# Patient Record
Sex: FEMALE | Race: WHITE | NOT HISPANIC OR LATINO | URBAN - METROPOLITAN AREA
[De-identification: names, ages, dates, MRNs, and addresses within clinical notes are randomized per-mention and may not be internally consistent; named-entity substitution may affect disease eponyms.]

---
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
## 2022-01-04 ENCOUNTER — APPOINTMENT (OUTPATIENT)
Dept: UROLOGY | Facility: CLINIC | Age: 22
End: 2022-01-04

## 2022-01-18 ENCOUNTER — APPOINTMENT (OUTPATIENT)
Dept: UROLOGY | Facility: CLINIC | Age: 22
End: 2022-01-18

## 2022-01-25 ENCOUNTER — APPOINTMENT (OUTPATIENT)
Dept: UROLOGY | Facility: CLINIC | Age: 22
End: 2022-01-25

## 2022-03-01 ENCOUNTER — APPOINTMENT (OUTPATIENT)
Dept: UROLOGY | Facility: CLINIC | Age: 22
End: 2022-03-01
Payer: MEDICAID

## 2022-03-01 DIAGNOSIS — R30.0 DYSURIA: ICD-10-CM

## 2022-03-01 DIAGNOSIS — R33.8 OTHER RETENTION OF URINE: ICD-10-CM

## 2022-03-01 DIAGNOSIS — N39.0 URINARY TRACT INFECTION, SITE NOT SPECIFIED: ICD-10-CM

## 2022-03-01 PROCEDURE — 99214 OFFICE O/P EST MOD 30 MIN: CPT

## 2022-03-01 PROCEDURE — 51798 US URINE CAPACITY MEASURE: CPT

## 2022-03-01 RX ORDER — MULTIVITAMIN
TABLET ORAL DAILY
Refills: 0 | Status: ACTIVE | COMMUNITY
Start: 2022-03-01

## 2022-03-01 RX ORDER — NADOLOL 20 MG/1
20 TABLET ORAL
Refills: 0 | Status: DISCONTINUED | COMMUNITY
End: 2022-03-01

## 2022-03-01 RX ORDER — FOLIC ACID 1 MG/1
1 TABLET ORAL
Qty: 30 | Refills: 0 | Status: DISCONTINUED | COMMUNITY
Start: 2017-10-05 | End: 2022-03-01

## 2022-03-01 RX ORDER — NADOLOL 20 MG/1
20 TABLET ORAL TWICE DAILY
Refills: 0 | Status: ACTIVE | COMMUNITY
Start: 2022-03-01

## 2022-03-01 RX ORDER — MELATONIN 3 MG
3 CAPSULE ORAL
Refills: 0 | Status: DISCONTINUED | COMMUNITY
End: 2022-03-01

## 2022-03-01 RX ORDER — DIVALPROEX SODIUM 500 MG/1
500 TABLET, DELAYED RELEASE ORAL
Refills: 0 | Status: DISCONTINUED | COMMUNITY
End: 2022-03-01

## 2022-03-24 ENCOUNTER — APPOINTMENT (OUTPATIENT)
Dept: PEDIATRICS | Facility: CLINIC | Age: 22
End: 2022-03-24

## 2022-04-19 ENCOUNTER — APPOINTMENT (OUTPATIENT)
Dept: UROLOGY | Facility: CLINIC | Age: 22
End: 2022-04-19
Payer: MEDICAID

## 2022-04-19 VITALS
HEART RATE: 69 BPM | SYSTOLIC BLOOD PRESSURE: 116 MMHG | WEIGHT: 126 LBS | TEMPERATURE: 97.7 F | BODY MASS INDEX: 20.49 KG/M2 | OXYGEN SATURATION: 95 % | DIASTOLIC BLOOD PRESSURE: 78 MMHG | HEIGHT: 65.75 IN | RESPIRATION RATE: 14 BRPM

## 2022-04-19 DIAGNOSIS — R35.0 FREQUENCY OF MICTURITION: ICD-10-CM

## 2022-04-19 PROCEDURE — 99213 OFFICE O/P EST LOW 20 MIN: CPT

## 2022-04-19 RX ORDER — OXYBUTYNIN CHLORIDE 10 MG/1
10 TABLET, EXTENDED RELEASE ORAL DAILY
Qty: 30 | Refills: 5 | Status: DISCONTINUED | COMMUNITY
Start: 2021-10-15 | End: 2022-04-19

## 2022-05-04 ENCOUNTER — APPOINTMENT (OUTPATIENT)
Dept: PEDIATRICS | Facility: CLINIC | Age: 22
End: 2022-05-04

## 2022-05-06 ENCOUNTER — APPOINTMENT (OUTPATIENT)
Dept: PEDIATRICS | Facility: CLINIC | Age: 22
End: 2022-05-06
Payer: MEDICAID

## 2022-05-06 VITALS — TEMPERATURE: 97.9 F

## 2022-05-06 DIAGNOSIS — R10.9 UNSPECIFIED ABDOMINAL PAIN: ICD-10-CM

## 2022-05-06 PROCEDURE — 99213 OFFICE O/P EST LOW 20 MIN: CPT

## 2022-05-06 NOTE — HISTORY OF PRESENT ILLNESS
[de-identified] : gastro pain wants referral  [FreeTextEntry6] : here for epigastric pain x4 days. was seen in ER and diagnosed with GERD, given pepcid, mylanta and lidocaine with some relief. pain is worsening right after eating, sharp, stabbing pain in nature and lasts hours. today having 3 episodes of diarrhea. no blood in stool. no N/V. no fevers. EKG was normal in ER. eating bland foods. diverticulits and GERD in family history.

## 2022-05-06 NOTE — DISCUSSION/SUMMARY
[FreeTextEntry1] : Start pepcid as ordered. Continue bland, binding diet in small portions. FOr diarrhea, increase fluids. Requested to see GI at this time. will follow up.

## 2022-05-06 NOTE — REVIEW OF SYSTEMS
[Diarrhea] : diarrhea [Abdominal Pain] : abdominal pain [Negative] : Genitourinary [Fever] : no fever [Vomiting] : no vomiting [Constipation] : no constipation

## 2022-05-06 NOTE — PHYSICAL EXAM
[Tenderness with Palpation] : tenderness with palpation [Psoas Sign Negative] : psoas sign negative [Obturator Sign Negative] : obturator sign negative [NL] : warm [FreeTextEntry9] : tenderness to epigastric area.

## 2022-05-07 DIAGNOSIS — K21.9 GASTRO-ESOPHAGEAL REFLUX DISEASE W/OUT ESOPHAGITIS: ICD-10-CM

## 2022-06-23 NOTE — HISTORY OF PRESENT ILLNESS
Recent issues:  Followup diabetes and thyroid evaluation, with daughter Jessie  Recent health issues with right abdomen pain, some skin itching, then hospitalized at [facility] 5/2022,    She reports being diagnosed with liver problem and suspicion for PBC, GI eval with Dr. Zuniga    Had head MRI, plan for future abdomen MRI and liver biopsy  Using the metformin, Ozempic, VGo pod device with Novolog and the Freestyle Chandni sensor  Taking the [place] thyroid and levothyroxine meds regularly  No longer employed, loses insurance 7/1/22?           Diabetes:  ~4/2010. Initial diagnosis approx age 47  ...Has taken several DM meds including metformin, Byetta, glimeparide, Invokana, and Victoza   In 2018, she had been taking metformin, Victoza, glimeparide, and Jardiance  She stopped Jardiance, also ran out of Victoza last year  10/2018. Abdominal abcess illness, hospitalizations at [facility] and then [facility] hospital              2-surgeries for abcess, thought related to the hysterectomy bladder sling mesh from prior surgery     Previously taking metformin 750 mg BID, glimeparide 4 mg every day, Tresiba 40U every day   10/2020. Changed to VGo40 pods management  Continue low dose glimeparide, but low SG levels and glimeparide discontinued, then restarted   Current DM meds:  Novolog in VGo40      Small carb meal 1-2 clicks      Large carb meal 2 clicks    Novolog    sscale:  -250  1-click      -300  2 clicks  Metformin 750 mg 2-tabs in morning  Ozempic 0.5 mg  Subcutaneous weekly    Has Glucocard Vital BG meter              Infrequent BG testing  10/2020. Started Freestyle Chandni CGM with [facility]  Recent Chandni data: none available    Recent [facility] labs include:  7/26/19 hgbA1c 9.3%, t.chol 195, , TSH 0.06, FT4 1.46, FT3 8.7, Cr 0.77  8/26/20 hgbA1c 11.4%  10/15/21 hgbA1c 6.6%, LDL 60 mg/dl     Recent  labs include:  Lab Results   Component Value Date    A1C 7.2 (H) 01/05/2021     05/26/2022     POTASSIUM 3.8 2022    CHLORIDE 105 2022    CO2 24 2022    ANIONGAP 6 2022     (H) 2022    BUN 15 2022    CR 0.57 2022    GFRESTIMATED >90 2022    GFRESTBLACK >90 2021    JOANN 8.7 2022    CHOL 151 2021    TRIG 194 (A) 10/15/2021    HDL 41 (L) 2021    LDL 76 2021    NHDL 110 2021    UCRR 86 2021    MICROL 19 2021    UMALCR 22.31 2021     Last eye exam ?, results not available  DM Complications:  None known        Thyroid:  Had taken Synthroid  Changed to [place] thyroid medication, then [place] + levothyroxine combo dose plan  Had taken [place] thyroid 2-tablets daily... Possibly 60 mg 2-tabs QD  Early-mid 3/2018. Ran out of [place] thyroid medication  Previously took [place] thyroid 90 mg 2-tabs daily and low dose levothyroxine 0.025 mg daily  2019. Changed to lower dose [place] and higher dose levothyroxine  Subsequent dose reductions with levothyroxine medication  Mid 2021 to early 2022. 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 Frantz Guillen?/[facility]       PMH/PSH:  Past Medical History:   Diagnosis Date     Hypothyroid 80's     Necrotizing fasciitis (H)      Primary osteoarthritis of both knees      Soft tissue infection      Type 2 diabetes mellitus (H)      Past Surgical History:   Procedure Laterality Date      SECTION       COLONOSCOPY N/A 10/21/2015     Procedure: COLONOSCOPY;  Surgeon: Jaky Arredondo MD;  Location:  GI     IRRIGATION AND DEBRIDEMENT ABDOMEN WASHOUT, COMBINED N/A 10/12/2018    Procedure: COMBINED IRRIGATION AND DEBRIDEMENT ABDOMEN WASHOUT;  Irrigation and Debridement of Lower Abdomen, removal of piece of mesh.;  Surgeon: Darlene Hogue MD;  Location: [facility] OR     marisol/bso  2004    due to anemia     ZZC REPAIR CRUCIATE LIGAMENT,KNEE  1986       Family Hx:  No family history on file.      Social Hx:  Social History     Socioeconomic History     Marital status:      Spouse name: Not on file     Number of children: 2     Years of education: Not on file     Highest education level: Not on file   Occupational History     Occupation: surgery scheduler [employer]   Tobacco Use     Smoking status: Former Smoker     Quit date: 2011     Years since quittin.3     Smokeless tobacco: Never Used   Substance and Sexual Activity     Alcohol use: No     Drug use: No     Sexual activity: Not on file   Other Topics Concern     Not on file   Social History Narrative     Not on file     Social Determinants of Health     Financial Resource Strain: Not on file   Food Insecurity: Not on file   Transportation Needs: Not on file   Physical Activity: Not on file   Stress: Not on file   Social Connections: Not on file   Intimate Partner Violence: Not on file   Housing Stability: Not on file          MEDICATIONS:  has a current medication list which includes the following prescription(s): celecoxib, insulin lispro, levothyroxine, metformin, omeprazole, semaglutide, thyroid, valacyclovir, wearable insulin delivery device, acetaminophen, blood glucose, freestyle evelyn reader, freestyle evelyn 14 day sensor, b-d u/f, and [DISCONTINUED] rosuvastatin.     ROS: 10 point ROS neg other than the symptoms noted above in the HPI.     GENERAL: some fatigue, wt loss?; denies fevers, chills, night sweats.   HEENT:  no dysphagia, odonophagia, diplopia,  neck pain  THYROID:  no apparent hyper or hypothyroid symptoms  CV:  some palpitations; denies chest pain, pressure  LUNGS:  denies SOB, dyspnea, cough, wheezing  ABDOMEN:  Intermittent right abdomen pains; denies diarrhea, indigestion, constipation  EXTREMITIES: no rashes, ulcers, edema  NEUROLOGY: forgetfulness; no headaches, denies changes in vision, tingling, extremitiy numbness   MSK: knee and some low back pain; no muscle aches or pains, weakness  SKIN: no rashes or lesions  : no increased thirst and urination, nocturia; no menses since hysterectomy ~age 52  PSYCH:  stable mood, no significant anxiety or depression  ENDOCRINE: no heat or cold intolerance      Physical Exam   VS: /81   Pulse 73   Wt 106.2 kg (234 lb 3.2 oz)   BMI 40.20 kg/m    GENERAL: AXOX3, NAD, well dressed, answering questions appropriately, appears stated age.  ENT: no nose swelling or nasal discharge, mouth redness or gum changes.  EYES: eyes grossly normal to inspection, conjunctivae and sclerae normal, no exophthalmos or proptosis  THYROID:  no apparent nodules or goiter  LUNGS: no audible wheeze, cough or visible cyanosis, or increased work of breathing  ABDOMEN: abdomen size  EXTREMITIES: no edema noted  NEUROLOGY: CN grossly intact, no tremors  MSK: grossly intact  SKIN:  no apparent skin lesions, rash, or edema with visualized skin appearance  PSYCH: mentation appears normal, affect normal/bright, judgement and insight intact,   normal speech and appearance well groomed     LABS:     All pertinent notes, labs, and images personally reviewed by me.      A/P:  Encounter Diagnoses   Name Primary?     Type 2 diabetes mellitus without complication, without long-term current use of insulin (H) Yes     Acquired hypothyroidism      Morbid obesity (H)        Comments:  Reviewed health issues, diabetes and thyroid management.  Difficult to assess glycemic control without BGM or SG data  Reviewed recent available lab results from  [facility] clinic  Reviewed and interpreted tests that I previously ordered.   Ordered appropriate tests for the endocrinology disease management.    Management options discussed and implemented after shared medical decision making with the patient.  T2DM and hypothyroidism problems are chronic-stable    Plan:  Discussed general issues with the diabetes diagnosis and management  We discussed the hgbA1c test which reflects previous overall glucose levels or control  Discussed the importance of blood glucose (BG) testing to assess glucose trends  Provided general overview of the diabetes medication options and medication treatment plan  Reviewed recent Chandni CGM glucose trend data, in detail.    Recommend:  Continue the current VGo40, metformin, and Ozempic med plan at this time  We have reviewed dosing options using the VGo clicks for small and large carb meals  Consider change to the OmniPod pump alternative, if affordable  Goal target -150 mg/dl  Contact me if cost issues with Novolog, VGo, Ozempic, or other endo meds  Continue use of the 14-day Freestyle Chandni CGM sensor    Scan sensor 3-4x/day... more often    Use SG value for final decision on mealtime VGo clicks  Lab testing:    No lab tests ordered at this time    Needs repeat thyroid and diabetes lab testing in 8/2022 or 9/2022  Keep focus on diet, exercise, weight management.  Change to NP-Thyroid 60 mg daily and levothyroxine 88 daily dose plan    Sent updated Rx's to [facility]  Needs f/u on the mild hypercholesterolemia, consider adding statin medication for treatment  Keep focus on diet, exercise, weight management.  Advise having fasting lipid panel testing and dilated eye examination, at least annually    Addressed patient questions today     There are no Patient Instructions on file for this visit.    Future labs ordered today: No orders of the defined types were placed in this encounter.    Radiology/Consults ordered today: None    Total  time spent in with the patient evaluation:  25 min  Additional time spent reviewing pertinent lab tests and chart notes, and documentation:  10 min    Follow-up:  10/2022    JOJO Christianson MD, MS  Endocrinology  [facility]                                         [FreeTextEntry1] : Misty is a 17 year old female teenager with anxiety, possible ADHD and epilepsy who presents for a cardiac evaluation in regard to a heart murmur. Misty initially presented at 9 years of age with a heart murmur for evaluation by Dr. Cunningham at [facility]. He subsequently evaluated her for several years due to her innocent heart murmur (last visit which included an ECG and echocardiogram in 2014). Misty has anxiety and was recently diagnosed with possible ADHD and was started on Strattera five weeks ago (prescribed by her neurologist Dr. Alfonso Mulligan). As reported by her mother, both Dr. Mulligan and Dr. Mcgill (PCP) felt that her murmur sounds more pronounced and recommended a repeat evaluation.  Misty denies chest pain, SOB, palpitations, dizziness or syncope.  She does say that she stopped dance class earlier this year because she would get short of breath (this never happened to her previously). There are no known allergies.  Immunizations are up to date.  \par Her mother has a history for hypercholesterolemia and her maternal grandfather underwent a CABG in his 50's.  There is no known paternal family history.  She denies the use of tobacco.\par

## 2022-07-14 ENCOUNTER — NON-APPOINTMENT (OUTPATIENT)
Age: 22
End: 2022-07-14

## 2022-08-02 ENCOUNTER — LABORATORY RESULT (OUTPATIENT)
Age: 22
End: 2022-08-02

## 2022-08-02 ENCOUNTER — NON-APPOINTMENT (OUTPATIENT)
Age: 22
End: 2022-08-02

## 2022-08-02 ENCOUNTER — APPOINTMENT (OUTPATIENT)
Dept: UROLOGY | Facility: CLINIC | Age: 22
End: 2022-08-02

## 2022-08-02 DIAGNOSIS — I42.1 OBSTRUCTIVE HYPERTROPHIC CARDIOMYOPATHY: ICD-10-CM

## 2022-08-02 PROCEDURE — 99213 OFFICE O/P EST LOW 20 MIN: CPT

## 2022-08-02 RX ORDER — LAMOTRIGINE 25 MG/1
25 TABLET ORAL
Qty: 49 | Refills: 0 | Status: DISCONTINUED | COMMUNITY
Start: 2021-12-16 | End: 2022-08-02

## 2022-08-02 RX ORDER — LAMOTRIGINE 50 MG/1
50 TABLET, EXTENDED RELEASE ORAL
Qty: 30 | Refills: 0 | Status: DISCONTINUED | COMMUNITY
Start: 2022-02-22 | End: 2022-08-02

## 2022-08-02 RX ORDER — LAMOTRIGINE 100 MG/1
100 TABLET, EXTENDED RELEASE ORAL
Qty: 30 | Refills: 0 | Status: DISCONTINUED | COMMUNITY
Start: 2022-02-22 | End: 2022-08-02

## 2022-08-02 RX ORDER — LAMOTRIGINE 150 MG/1
150 TABLET ORAL DAILY
Refills: 0 | Status: DISCONTINUED | COMMUNITY
Start: 2022-03-01 | End: 2022-08-02

## 2022-08-02 RX ORDER — LAMOTRIGINE 100 MG/1
100 TABLET ORAL
Qty: 30 | Refills: 0 | Status: DISCONTINUED | COMMUNITY
Start: 2021-12-16 | End: 2022-08-02

## 2022-08-02 RX ORDER — DIVALPROEX SODIUM 500 MG/1
500 TABLET, DELAYED RELEASE ORAL
Refills: 0 | Status: DISCONTINUED | COMMUNITY
Start: 2022-03-01 | End: 2022-08-02

## 2022-08-30 ENCOUNTER — APPOINTMENT (OUTPATIENT)
Dept: UROLOGY | Facility: CLINIC | Age: 22
End: 2022-08-30

## 2022-09-27 ENCOUNTER — RX RENEWAL (OUTPATIENT)
Age: 22
End: 2022-09-27

## 2022-09-27 ENCOUNTER — NON-APPOINTMENT (OUTPATIENT)
Age: 22
End: 2022-09-27

## 2022-09-29 ENCOUNTER — APPOINTMENT (OUTPATIENT)
Dept: UROLOGY | Facility: CLINIC | Age: 22
End: 2022-09-29

## 2022-10-18 ENCOUNTER — APPOINTMENT (OUTPATIENT)
Dept: UROLOGY | Facility: CLINIC | Age: 22
End: 2022-10-18

## 2022-12-15 ENCOUNTER — APPOINTMENT (OUTPATIENT)
Dept: PEDIATRICS | Facility: CLINIC | Age: 22
End: 2022-12-15

## 2022-12-15 VITALS
HEIGHT: 66 IN | WEIGHT: 118.25 LBS | HEART RATE: 89 BPM | BODY MASS INDEX: 19.01 KG/M2 | DIASTOLIC BLOOD PRESSURE: 73 MMHG | SYSTOLIC BLOOD PRESSURE: 110 MMHG | TEMPERATURE: 98.4 F

## 2022-12-15 DIAGNOSIS — I42.2 OTHER HYPERTROPHIC CARDIOMYOPATHY: ICD-10-CM

## 2022-12-15 DIAGNOSIS — G40.909 EPILEPSY, UNSPECIFIED, NOT INTRACTABLE, W/OUT STATUS EPILEPTICUS: ICD-10-CM

## 2022-12-15 DIAGNOSIS — Z00.00 ENCOUNTER FOR GENERAL ADULT MEDICAL EXAMINATION W/OUT ABNORMAL FINDINGS: ICD-10-CM

## 2022-12-15 PROCEDURE — 96160 PT-FOCUSED HLTH RISK ASSMT: CPT | Mod: 59

## 2022-12-15 PROCEDURE — 96127 BRIEF EMOTIONAL/BEHAV ASSMT: CPT

## 2022-12-15 PROCEDURE — 99173 VISUAL ACUITY SCREEN: CPT | Mod: 59

## 2022-12-15 PROCEDURE — 99395 PREV VISIT EST AGE 18-39: CPT

## 2022-12-15 RX ORDER — PHENAZOPYRIDINE HYDROCHLORIDE 200 MG/1
200 TABLET ORAL
Qty: 90 | Refills: 0 | Status: COMPLETED | COMMUNITY
Start: 2021-09-02 | End: 2022-12-15

## 2022-12-15 RX ORDER — DIVALPROEX SODIUM 250 MG/1
250 TABLET, DELAYED RELEASE ORAL
Refills: 0 | Status: COMPLETED | COMMUNITY
Start: 2017-10-05 | End: 2022-12-15

## 2022-12-15 RX ORDER — SULFAMETHOXAZOLE AND TRIMETHOPRIM 800; 160 MG/1; MG/1
800-160 TABLET ORAL
Qty: 10 | Refills: 1 | Status: COMPLETED | COMMUNITY
Start: 2022-03-01 | End: 2022-12-15

## 2022-12-15 RX ORDER — SOLIFENACIN SUCCINATE 5 MG/1
5 TABLET ORAL
Qty: 30 | Refills: 1 | Status: COMPLETED | COMMUNITY
Start: 2022-08-02 | End: 2022-12-15

## 2022-12-15 RX ORDER — LAMOTRIGINE 200 MG/1
200 TABLET, EXTENDED RELEASE ORAL
Refills: 0 | Status: COMPLETED | COMMUNITY
Start: 2022-08-02 | End: 2022-12-15

## 2022-12-15 RX ORDER — FAMOTIDINE 20 MG/1
20 TABLET, FILM COATED ORAL
Qty: 28 | Refills: 0 | Status: COMPLETED | COMMUNITY
Start: 2022-05-07 | End: 2022-12-15

## 2022-12-15 RX ORDER — OXYBUTYNIN CHLORIDE 5 MG/1
5 TABLET, EXTENDED RELEASE ORAL
Qty: 30 | Refills: 0 | Status: COMPLETED | COMMUNITY
Start: 2021-10-15 | End: 2022-12-15

## 2022-12-15 NOTE — DISCUSSION/SUMMARY
[] : The components of the vaccine(s) to be administered today are listed in the plan of care. The disease(s) for which the vaccine(s) are intended to prevent and the risks have been discussed with the caretaker.  The risks are also included in the appropriate vaccination information statements which have been provided to the patient's caregiver.  The caregiver has given consent to vaccinate. [FreeTextEntry1] : Continue balanced diet with all food groups. Brush teeth twice a day with toothbrush. Recommend visit to dentist. Maintain consistent daily routines and sleep schedule. Personal hygiene, puberty, and sexual health reviewed. Risky behaviors assessed. School discussed. Limit screen time to no more than 2 hours per day. Encourage physical activity.\par Return 1 year for routine well child check.\par \par \par Hx epilepsy - followed by neurology - had 1 seizure over the summer while switching medication to lamotrigine - well controlled since then\par Hx anxiety - continues on zoloft, doing well, no changes to medication\par Hx HMC - follows with cardiology - still on beta blocker, last echo showing some improvement of obstruction - per cardiology, should avoid all strenuous sports/activities\par Hx urinary urgency - followed by urologist, continues on same medication - has seen some improvement in last year\par Labs will be completed on neurology visit next month

## 2022-12-15 NOTE — HISTORY OF PRESENT ILLNESS
[Mother] : mother [Yes] : Patient goes to dentist yearly [Up to date] : Up to date [Normal] : normal [Normal Performance] : normal performance [Normal Behavior/Attention] : normal behavior/attention [Normal Homework] : normal homework [Eats regular meals including adequate fruits and vegetables] : eats regular meals including adequate fruits and vegetables [Drinks non-sweetened liquids] : drinks non-sweetened liquids  [Calcium source] : calcium source [Has concerns about body or appearance] : does not have concerns about body or appearance [Has friends] : has friends [At least 1 hour of physical activity a day] : at least 1 hour of physical activity a day [Screen time (except homework) less than 2 hours a day] : screen time (except homework) less than 2 hours a day [Has interests/participates in community activities/volunteers] : has interests/participates in community activities/volunteers. [Uses electronic nicotine delivery system] : does not use electronic nicotine delivery system [Exposure to electronic nicotine delivery system] : no exposure to electronic nicotine delivery system [Uses tobacco] : does not use tobacco [Exposure to tobacco] : no exposure to tobacco [Uses drugs] : does not use drugs  [Exposure to drugs] : no exposure to drugs [Drinks alcohol] : does not drink alcohol [Exposure to alcohol] : no exposure to alcohol [Uses safety belts/safety equipment] : uses safety belts/safety equipment  [Impaired/distracted driving] : no impaired/distracted driving [Has peer relationships free of violence] : has peer relationships free of violence [No] : Patient has not had sexual intercourse. [HIV Screening Declined] : HIV Screening Declined [Has ways to cope with stress] : has ways to cope with stress [Displays self-confidence] : displays self-confidence [Has problems with sleep] : does not have problems with sleep [Gets depressed, anxious, or irritable/has mood swings] : does not get depressed, anxious, or irritable/has mood swings [Has thought about hurting self or considered suicide] : has not thought about hurting self or considered suicide [With Teen] : teen [FreeTextEntry1] : ANNUAL PHYSICAL

## 2023-02-21 ENCOUNTER — APPOINTMENT (OUTPATIENT)
Dept: UROLOGY | Facility: CLINIC | Age: 23
End: 2023-02-21

## 2023-03-22 ENCOUNTER — APPOINTMENT (OUTPATIENT)
Dept: UROLOGY | Facility: CLINIC | Age: 23
End: 2023-03-22
Payer: MEDICAID

## 2023-03-22 VITALS
OXYGEN SATURATION: 96 % | TEMPERATURE: 97.4 F | DIASTOLIC BLOOD PRESSURE: 79 MMHG | HEART RATE: 86 BPM | SYSTOLIC BLOOD PRESSURE: 111 MMHG

## 2023-03-22 DIAGNOSIS — R31.29 OTHER MICROSCOPIC HEMATURIA: ICD-10-CM

## 2023-03-22 PROCEDURE — 99213 OFFICE O/P EST LOW 20 MIN: CPT

## 2023-03-22 RX ORDER — LAMOTRIGINE 300 MG/1
300 TABLET, EXTENDED RELEASE ORAL
Qty: 30 | Refills: 0 | Status: ACTIVE | COMMUNITY
Start: 2022-12-15

## 2023-03-22 NOTE — HISTORY OF PRESENT ILLNESS
[FreeTextEntry1] : She is a 22-year-old female who presents for lower urinary tract symptoms, specifically urgency and frequency.  She has been on oxybutynin 15 mg.\par \par She has seen Monse Hoyt, Dr Goncalves, and Dr. Mccarthy the past\par \par She underwent a cystoscopy December 2021 which was normal\par \par Urine culture August 8, 2022: Less than 10,000 CFU  ozzie\par Urine culture 12/6/2021: Less than 10,000 CFU  ozzie\par \par RADHA 8/2021 - normal\par \par 3/22/2023\par Main symptoms urgency with rare incontinence\par Her frequency has improved with bladder training\par Has not had a UTI in past 1 year\par Ditropan 15 mg - thinks symptoms are improved on this\par Also on methanamine\par No gross hematuria\par No dysuria

## 2023-03-22 NOTE — ASSESSMENT
[FreeTextEntry1] : 22 y.o. F with OAB\par - PVR low\par - No more UTIs\par - Previously with microscopic hematuria - s/p negative cystoscopy / RADHA 2021\par - Content on ditropan, bladder training\par - Discussed 3rd line agents - PTNS, botox, interstim - she is not interested at this time and is content with symptoms currently, but if needed a 3rd line agent, she would prefer PTNS. Discussed similar efficacy to oral agents\par - RV 1 year

## 2023-03-22 NOTE — PHYSICAL EXAM
[Normal Appearance] : normal appearance [Abdomen Tenderness] : non-tender [Costovertebral Angle Tenderness] : no ~M costovertebral angle tenderness [] : no rash

## 2023-03-23 LAB
APPEARANCE: CLEAR
BACTERIA: ABNORMAL
BILIRUBIN URINE: NEGATIVE
BLOOD URINE: NEGATIVE
COLOR: YELLOW
GLUCOSE QUALITATIVE U: NEGATIVE
HYALINE CASTS: 0 /LPF
KETONES URINE: NORMAL
LEUKOCYTE ESTERASE URINE: NEGATIVE
MICROSCOPIC-UA: NORMAL
NITRITE URINE: NEGATIVE
PH URINE: 6.5
PROTEIN URINE: ABNORMAL
RED BLOOD CELLS URINE: 1 /HPF
SPECIFIC GRAVITY URINE: 1.03
SQUAMOUS EPITHELIAL CELLS: 16 /HPF
UROBILINOGEN URINE: NORMAL
WHITE BLOOD CELLS URINE: 6 /HPF

## 2023-03-27 ENCOUNTER — NON-APPOINTMENT (OUTPATIENT)
Age: 23
End: 2023-03-27

## 2023-03-27 LAB — BACTERIA UR CULT: ABNORMAL

## 2023-05-08 ENCOUNTER — RX RENEWAL (OUTPATIENT)
Age: 23
End: 2023-05-08

## 2023-05-22 ENCOUNTER — NON-APPOINTMENT (OUTPATIENT)
Age: 23
End: 2023-05-22

## 2023-12-05 ENCOUNTER — RX RENEWAL (OUTPATIENT)
Age: 23
End: 2023-12-05

## 2023-12-13 ENCOUNTER — RX RENEWAL (OUTPATIENT)
Age: 23
End: 2023-12-13

## 2023-12-13 RX ORDER — OXYBUTYNIN CHLORIDE 15 MG/1
15 TABLET, EXTENDED RELEASE ORAL DAILY
Qty: 30 | Refills: 2 | Status: ACTIVE | COMMUNITY
Start: 2022-03-01 | End: 1900-01-01

## 2024-04-03 ENCOUNTER — APPOINTMENT (OUTPATIENT)
Dept: UROLOGY | Facility: CLINIC | Age: 24
End: 2024-04-03
Payer: SELF-PAY

## 2024-04-03 DIAGNOSIS — R39.15 URGENCY OF URINATION: ICD-10-CM

## 2024-04-03 PROCEDURE — 99442: CPT | Mod: 93

## 2024-04-03 NOTE — ASSESSMENT
[FreeTextEntry1] : 23-year-old female with OAB, urgency and incontinence.  She is on Ditropan and has been doing well.  #Demetria - Continue methenamine - Increase hydration - Timed voids, double voids, voiding after intercourse  #OAB - Content on ditropan 15mg XL.  Did discuss third line agents are also options.  Discussed the emerging data about the risk of dementia with long-term use of anticholinergics.  We discussed Botox, PTNS, InterStim.  She is most interested in Botox.  Discussed the small risk of retention, UTIs.  She will talk to her mom and let me know, but is leaning towards proceeding with Botox.  She will call and let us know her decision  Telehealth Consultation: 15 minutes - 8 minutes reviewing his history and discussing prior results. 7 minutes discussing various treatment options and writing his note.

## 2024-04-03 NOTE — HISTORY OF PRESENT ILLNESS
[FreeTextEntry1] : This telephonic visit was provided via audio only technology. The patient, was located at [facility] at the time of the visit.  The provider, Justen Levi, was located at [place], [place] at the time of the visit. The patient and Provider participated in the telephonic visit.  Verbal consent for telephonic services was given on 4/3/2024 by the patient.   The patient-doctor relationship has been established in a face to face fashion via real time video/audio HIPAA compliant communication using telemedicine software. The patient's identity has been confirmed. The patient was previously emailed a copy of the telemedicine consent. They have had a chance to review and has now given verbal consent and has requested care to be assessed and treated via telemedicine. They understand there may be limitations in this process, and that they may need further followup care in the office and/or hospital settings. 23-year-old female who presents as a follow-up  She is a 23-year-old female with a history of OAB, specifically urgency and incontinence.  She is on Ditropan 15 mg.  Also on methenamine for history of recurrent UTIs. Underwent cystoscopy, renal ultrasound 2021 for microscopic hematuria  Regarding her urinary symptoms, she remains on ditropan 15mg. This works well for her Only one UTI in the past 1 year - happened after sexual intercourse when she did not urinate. Went to gynecologist and was given abx

## 2024-04-05 RX ORDER — METHENAMINE HIPPURATE 1 G/1
1 TABLET ORAL
Qty: 60 | Refills: 3 | Status: ACTIVE | COMMUNITY
Start: 2021-09-02 | End: 1900-01-01

## 2024-04-08 ENCOUNTER — NON-APPOINTMENT (OUTPATIENT)
Age: 24
End: 2024-04-08

## 2024-05-14 ENCOUNTER — APPOINTMENT (OUTPATIENT)
Dept: UROLOGY | Facility: CLINIC | Age: 24
End: 2024-05-14
Payer: COMMERCIAL

## 2024-05-14 ENCOUNTER — APPOINTMENT (OUTPATIENT)
Dept: UROLOGY | Facility: CLINIC | Age: 24
End: 2024-05-14

## 2024-05-14 VITALS
WEIGHT: 118.25 LBS | DIASTOLIC BLOOD PRESSURE: 71 MMHG | BODY MASS INDEX: 19.01 KG/M2 | RESPIRATION RATE: 16 BRPM | OXYGEN SATURATION: 96 % | HEART RATE: 81 BPM | SYSTOLIC BLOOD PRESSURE: 106 MMHG | HEIGHT: 66 IN | TEMPERATURE: 98 F

## 2024-05-14 DIAGNOSIS — N32.81 OVERACTIVE BLADDER: ICD-10-CM

## 2024-05-14 PROCEDURE — 52287 CYSTOSCOPY CHEMODENERVATION: CPT

## 2024-05-14 RX ORDER — ONABOTULINUMTOXINA 100 [USP'U]/1
100 INJECTION, POWDER, LYOPHILIZED, FOR SOLUTION INTRADERMAL; INTRAMUSCULAR
Qty: 1 | Refills: 0 | Status: COMPLETED | OUTPATIENT
Start: 2024-05-14

## 2024-05-14 RX ADMIN — ONABOTULINUMTOXINA 0 UNIT: 100 INJECTION, POWDER, LYOPHILIZED, FOR SOLUTION INTRADERMAL; INTRAMUSCULAR at 00:00

## 2024-06-04 ENCOUNTER — APPOINTMENT (OUTPATIENT)
Dept: UROLOGY | Facility: CLINIC | Age: 24
End: 2024-06-04
Payer: COMMERCIAL

## 2024-06-04 VITALS
DIASTOLIC BLOOD PRESSURE: 64 MMHG | HEIGHT: 66 IN | HEART RATE: 84 BPM | OXYGEN SATURATION: 100 % | RESPIRATION RATE: 16 BRPM | WEIGHT: 118.25 LBS | SYSTOLIC BLOOD PRESSURE: 96 MMHG | BODY MASS INDEX: 19.01 KG/M2 | TEMPERATURE: 98 F

## 2024-06-04 DIAGNOSIS — N39.0 URINARY TRACT INFECTION, SITE NOT SPECIFIED: ICD-10-CM

## 2024-06-04 PROCEDURE — 99213 OFFICE O/P EST LOW 20 MIN: CPT

## 2024-06-04 RX ORDER — NITROFURANTOIN (MONOHYDRATE/MACROCRYSTALS) 25; 75 MG/1; MG/1
100 CAPSULE ORAL
Qty: 10 | Refills: 0 | Status: ACTIVE | COMMUNITY
Start: 2024-06-04 | End: 1900-01-01

## 2024-06-04 RX ORDER — NITROFURANTOIN MACROCRYSTALS 50 MG/1
50 CAPSULE ORAL
Qty: 30 | Refills: 3 | Status: ACTIVE | COMMUNITY
Start: 2024-06-04 | End: 1900-01-01

## 2024-06-04 NOTE — ASSESSMENT
[FreeTextEntry1] : 23-year-old female with OAB presents with bothersome urinary symptoms (increased frequency, dysuria) 2 weeks s/p botox -Dipstick UA leukocyte Estrace moderate, nitrate positive, blood positive - c/w UTI - PVR 6 mL - UA, UCx - Macrobid x 5 days -Reports infections typically occur after sexual intercourse despite adequate hygiene.  Discussed postcoital antibiotics.  She is interested.  Nitrofurantoin 50 mg prescribed after sex  TTM 1 month

## 2024-06-04 NOTE — PHYSICAL EXAM
[Normal Appearance] : normal appearance [Edema] : no peripheral edema [] : no respiratory distress [de-identified] : No suprapubic fullness

## 2024-06-05 LAB
APPEARANCE: ABNORMAL
BACTERIA: ABNORMAL /HPF
BILIRUBIN URINE: ABNORMAL
BLOOD URINE: ABNORMAL
CAST: 4 /LPF
COLOR: NORMAL
EPITHELIAL CELLS: 2 /HPF
GLUCOSE QUALITATIVE U: NEGATIVE MG/DL
KETONES URINE: 15 MG/DL
LEUKOCYTE ESTERASE URINE: ABNORMAL
MICROSCOPIC-UA: NORMAL
NITRITE URINE: POSITIVE
PH URINE: 6
PROTEIN URINE: 300 MG/DL
RED BLOOD CELLS URINE: NORMAL /HPF
REVIEW: NORMAL
SPECIFIC GRAVITY URINE: >1.03
UROBILINOGEN URINE: 1 MG/DL
WBC CLUMPS: PRESENT
WHITE BLOOD CELLS URINE: >998 /HPF

## 2024-06-10 ENCOUNTER — TRANSCRIPTION ENCOUNTER (OUTPATIENT)
Age: 24
End: 2024-06-10

## 2024-06-10 LAB — BACTERIA UR CULT: ABNORMAL

## 2024-06-18 ENCOUNTER — APPOINTMENT (OUTPATIENT)
Dept: UROLOGY | Facility: CLINIC | Age: 24
End: 2024-06-18

## 2024-07-17 ENCOUNTER — APPOINTMENT (OUTPATIENT)
Dept: UROLOGY | Facility: CLINIC | Age: 24
End: 2024-07-17

## 2024-09-24 ENCOUNTER — APPOINTMENT (OUTPATIENT)
Dept: UROLOGY | Facility: CLINIC | Age: 24
End: 2024-09-24
Payer: COMMERCIAL

## 2024-09-24 DIAGNOSIS — N32.81 OVERACTIVE BLADDER: ICD-10-CM

## 2024-09-24 PROCEDURE — 52287 CYSTOSCOPY CHEMODENERVATION: CPT

## 2024-09-24 RX ORDER — ONABOTULINUMTOXINA 100 [USP'U]/1
100 INJECTION, POWDER, LYOPHILIZED, FOR SOLUTION INTRADERMAL; INTRAMUSCULAR
Qty: 1 | Refills: 0 | Status: COMPLETED | OUTPATIENT
Start: 2024-09-24

## 2024-09-24 RX ADMIN — ONABOTULINUMTOXINA 0 UNIT: 100 INJECTION, POWDER, LYOPHILIZED, FOR SOLUTION INTRADERMAL; INTRAMUSCULAR at 00:00

## 2024-12-26 ENCOUNTER — APPOINTMENT (OUTPATIENT)
Dept: UROLOGY | Facility: CLINIC | Age: 24
End: 2024-12-26
Payer: COMMERCIAL

## 2024-12-26 DIAGNOSIS — N32.81 OVERACTIVE BLADDER: ICD-10-CM

## 2024-12-26 PROCEDURE — 99441: CPT

## 2025-01-23 LAB
APPEARANCE: ABNORMAL
BACTERIA: NEGATIVE /HPF
BILIRUBIN URINE: ABNORMAL
BLOOD URINE: ABNORMAL
CAST: 2 /LPF
COLOR: NORMAL
EPITHELIAL CELLS: 9 /HPF
GLUCOSE QUALITATIVE U: NEGATIVE MG/DL
KETONES URINE: 40 MG/DL
LEUKOCYTE ESTERASE URINE: ABNORMAL
MICROSCOPIC-UA: NORMAL
MUCUS: PRESENT
NITRITE URINE: POSITIVE
PH URINE: 6
PROTEIN URINE: 30 MG/DL
RED BLOOD CELLS URINE: 5 /HPF
REVIEW: NORMAL
SPECIFIC GRAVITY URINE: 1.03
UROBILINOGEN URINE: 1 MG/DL
WHITE BLOOD CELLS URINE: 38 /HPF

## 2025-01-23 RX ORDER — SULFAMETHOXAZOLE AND TRIMETHOPRIM 800; 160 MG/1; MG/1
800-160 TABLET ORAL
Qty: 6 | Refills: 0 | Status: ACTIVE | COMMUNITY
Start: 2025-01-23 | End: 1900-01-01

## 2025-01-27 LAB — BACTERIA UR CULT: NORMAL

## 2025-05-06 ENCOUNTER — NON-APPOINTMENT (OUTPATIENT)
Age: 25
End: 2025-05-06

## 2025-05-08 ENCOUNTER — APPOINTMENT (OUTPATIENT)
Dept: UROLOGY | Facility: CLINIC | Age: 25
End: 2025-05-08
Payer: COMMERCIAL

## 2025-05-08 DIAGNOSIS — N39.0 URINARY TRACT INFECTION, SITE NOT SPECIFIED: ICD-10-CM

## 2025-05-08 DIAGNOSIS — N32.81 OVERACTIVE BLADDER: ICD-10-CM

## 2025-05-08 PROCEDURE — 52287 CYSTOSCOPY CHEMODENERVATION: CPT

## 2025-05-08 RX ORDER — ONABOTULINUMTOXINA 100 [USP'U]/1
100 INJECTION, POWDER, LYOPHILIZED, FOR SOLUTION INTRADERMAL; INTRAMUSCULAR
Qty: 1 | Refills: 0 | Status: COMPLETED | OUTPATIENT
Start: 2025-05-08

## 2025-05-08 RX ADMIN — ONABOTULINUMTOXINA 0 UNIT: 100 INJECTION, POWDER, LYOPHILIZED, FOR SOLUTION INTRADERMAL; INTRAMUSCULAR at 00:00

## 2025-09-11 ENCOUNTER — APPOINTMENT (OUTPATIENT)
Dept: UROLOGY | Facility: CLINIC | Age: 25
End: 2025-09-11
Payer: COMMERCIAL

## 2025-09-11 VITALS
BODY MASS INDEX: 19.01 KG/M2 | HEART RATE: 97 BPM | SYSTOLIC BLOOD PRESSURE: 106 MMHG | DIASTOLIC BLOOD PRESSURE: 71 MMHG | HEIGHT: 66 IN | WEIGHT: 118.25 LBS | OXYGEN SATURATION: 97 % | RESPIRATION RATE: 16 BRPM

## 2025-09-11 PROCEDURE — 51798 US URINE CAPACITY MEASURE: CPT

## 2025-09-11 PROCEDURE — 99213 OFFICE O/P EST LOW 20 MIN: CPT

## 2025-09-12 LAB
APPEARANCE: ABNORMAL
BACTERIA: ABNORMAL /HPF
BILIRUBIN URINE: NEGATIVE
BLOOD URINE: ABNORMAL
CAST: 1 /LPF
COLOR: NORMAL
EPITHELIAL CELLS: 2 /HPF
GLUCOSE QUALITATIVE U: NEGATIVE MG/DL
KETONES URINE: ABNORMAL MG/DL
LEUKOCYTE ESTERASE URINE: ABNORMAL
MICROSCOPIC-UA: NORMAL
NITRITE URINE: NEGATIVE
PH URINE: 6.5
PROTEIN URINE: 100 MG/DL
RED BLOOD CELLS URINE: 20 /HPF
SPECIFIC GRAVITY URINE: 1.03
UROBILINOGEN URINE: 1 MG/DL
WHITE BLOOD CELLS URINE: 729 /HPF

## 2025-09-12 RX ORDER — SULFAMETHOXAZOLE AND TRIMETHOPRIM 800; 160 MG/1; MG/1
800-160 TABLET ORAL
Qty: 6 | Refills: 0 | Status: ACTIVE | COMMUNITY
Start: 2025-09-12 | End: 1900-01-01

## 2025-09-15 LAB — BACTERIA UR CULT: NORMAL
